# Patient Record
Sex: MALE | Race: WHITE | NOT HISPANIC OR LATINO | Employment: OTHER | URBAN - METROPOLITAN AREA
[De-identification: names, ages, dates, MRNs, and addresses within clinical notes are randomized per-mention and may not be internally consistent; named-entity substitution may affect disease eponyms.]

---

## 2020-05-07 ENCOUNTER — TRANSFERRED RECORDS (OUTPATIENT)
Dept: HEALTH INFORMATION MANAGEMENT | Facility: CLINIC | Age: 60
End: 2020-05-07

## 2020-05-07 LAB
ALT SERPL-CCNC: 41 U/L (ref 21–72)
AST SERPL-CCNC: 54 U/L (ref 17–59)
CREATININE (EXTERNAL): 1 MG/DL (ref 0.7–1.3)
GFR ESTIMATED (EXTERNAL): NORMAL ML/MIN/1.73M2
GFR ESTIMATED (IF AFRICAN AMERICAN) (EXTERNAL): NORMAL ML/MIN/1.73M2
GLUCOSE (EXTERNAL): 84 MG/DL (ref 74–106)
POTASSIUM (EXTERNAL): 3.5 MMOL/L (ref 3.4–5.1)

## 2020-05-14 ENCOUNTER — TRANSFERRED RECORDS (OUTPATIENT)
Dept: HEALTH INFORMATION MANAGEMENT | Facility: CLINIC | Age: 60
End: 2020-05-14

## 2023-03-31 ENCOUNTER — TRANSFERRED RECORDS (OUTPATIENT)
Dept: HEALTH INFORMATION MANAGEMENT | Facility: CLINIC | Age: 63
End: 2023-03-31

## 2023-04-24 ENCOUNTER — MEDICAL CORRESPONDENCE (OUTPATIENT)
Dept: HEALTH INFORMATION MANAGEMENT | Facility: CLINIC | Age: 63
End: 2023-04-24

## 2023-04-24 ENCOUNTER — TRANSFERRED RECORDS (OUTPATIENT)
Dept: HEALTH INFORMATION MANAGEMENT | Facility: CLINIC | Age: 63
End: 2023-04-24

## 2023-04-27 ENCOUNTER — TELEPHONE (OUTPATIENT)
Dept: SURGERY | Facility: CLINIC | Age: 63
End: 2023-04-27

## 2023-04-27 DIAGNOSIS — C18.7 MALIGNANT NEOPLASM OF SIGMOID COLON (H): Primary | ICD-10-CM

## 2023-04-27 NOTE — TELEPHONE ENCOUNTER
[facility]    Phone Message    May a detailed message be left on voicemail: yes     Reason for Call: Other:     Niesha from [facility] called to schedule a pt for adenocarcinoma of the colon. Caller stated they will be sending in a referral and records andthat that the pt needs to be seen ASAP. Caller also stated they will be pushing the CT scan that was done. Please review and reach out to pt to discuss an appt.       Colon Cancer adenocarcinoma  Jeferson Balderas @ [place], [place]  CT Scan (being pushed)      Caller stated pt can be reached at the below numbers and that the pt is hard of hearing and would prefer the call go to his spouse.    Pt cell- [phone]    Wife Hope- [phone]          Action Taken: Message routed to:  [facility] KRISTEN CARRERA     Travel Screening: Not Applicable

## 2023-04-27 NOTE — TELEPHONE ENCOUNTER
Colonoscopy done 4/24/2023 and found nearly obstructing sigmoid mass. Pathology confirmed adenocarcinoma.     CT CAP: 5/1/2023  CEA: 5/1/2023  Visit with surgeon: 5/2/2023

## 2023-04-27 NOTE — TELEPHONE ENCOUNTER
Diagnosis, Referred by & from: Nearly Obstructing Colon Cancer, Flex Sig   Appt date: 2023   NOTES STATUS DETAILS   OFFICE NOTE from referring provider N/A    OFFICE NOTE from other specialist Received [facility]:  3/31/23 - GEN SURG OV with Dr. Jesus  20, 20 - PCC OV with Dr. Rick   DISCHARGE SUMMARY from hospital N/A    DISCHARGE REPORT from the ER N/A    OPERATIVE REPORT N/A    MEDICATION LIST Received    LABS     ANAL PAP/CEA [facility]:  ([facility]) 23 - CEA   BIOPSIES/PATHOLOGY RELATED TO DIAGNOSIS Received [facility]:  23 - Colon Cancer (Case: [id number])   DIAGNOSTIC PROCEDURES     COLONOSCOPY Received [facility]:  23 - Colonoscopy   IMAGING (DISC & REPORT)      CT Received / [facility]:  ([facility]) 23 - CT Chest/Abd/Pelvis    [facility]:  20 - CT Abd/Pelvis     Records Requested  23    Facility  [facility]  Fax: [phone]   Outcome * 23 3:46 PM Faxed urg req to [facility] for pathology slides to be Fed'Exd to the clinic. - Lauren    * 5/3/23 11:06 AM Pathology received from [facility] and sent to be reviewed with filled out pathology form. - Lauren    Trackin

## 2023-05-01 ENCOUNTER — ANCILLARY PROCEDURE (OUTPATIENT)
Dept: CT IMAGING | Facility: CLINIC | Age: 63
End: 2023-05-01
Attending: SURGERY
Payer: MEDICAID

## 2023-05-01 ENCOUNTER — LAB (OUTPATIENT)
Dept: LAB | Facility: CLINIC | Age: 63
End: 2023-05-01
Payer: MEDICAID

## 2023-05-01 DIAGNOSIS — C18.7 MALIGNANT NEOPLASM OF SIGMOID COLON (H): ICD-10-CM

## 2023-05-01 LAB — CEA SERPL-MCNC: 72.2 NG/ML

## 2023-05-01 PROCEDURE — 74177 CT ABD & PELVIS W/CONTRAST: CPT | Performed by: RADIOLOGY

## 2023-05-01 PROCEDURE — 36415 COLL VENOUS BLD VENIPUNCTURE: CPT | Performed by: PATHOLOGY

## 2023-05-01 PROCEDURE — 82378 CARCINOEMBRYONIC ANTIGEN: CPT | Performed by: SURGERY

## 2023-05-01 PROCEDURE — 71260 CT THORAX DX C+: CPT | Performed by: RADIOLOGY

## 2023-05-01 RX ORDER — IOPAMIDOL 755 MG/ML
117 INJECTION, SOLUTION INTRAVASCULAR ONCE
Status: COMPLETED | OUTPATIENT
Start: 2023-05-01 | End: 2023-05-01

## 2023-05-01 RX ADMIN — IOPAMIDOL 117 ML: 755 INJECTION, SOLUTION INTRAVASCULAR at 07:07

## 2023-05-02 ENCOUNTER — PRE VISIT (OUTPATIENT)
Dept: SURGERY | Facility: CLINIC | Age: 63
End: 2023-05-02

## 2023-05-02 ENCOUNTER — PATIENT OUTREACH (OUTPATIENT)
Dept: ONCOLOGY | Facility: CLINIC | Age: 63
End: 2023-05-02

## 2023-05-02 ENCOUNTER — OFFICE VISIT (OUTPATIENT)
Dept: SURGERY | Facility: CLINIC | Age: 63
End: 2023-05-02
Payer: MEDICAID

## 2023-05-02 VITALS — DIASTOLIC BLOOD PRESSURE: 101 MMHG | HEART RATE: 80 BPM | SYSTOLIC BLOOD PRESSURE: 182 MMHG | OXYGEN SATURATION: 98 %

## 2023-05-02 DIAGNOSIS — C18.7 CANCER OF SIGMOID COLON METASTATIC TO INTRA-ABDOMINAL LYMPH NODE (H): ICD-10-CM

## 2023-05-02 DIAGNOSIS — C18.7 MALIGNANT NEOPLASM OF SIGMOID COLON (H): Primary | ICD-10-CM

## 2023-05-02 DIAGNOSIS — C77.2 CANCER OF SIGMOID COLON METASTATIC TO INTRA-ABDOMINAL LYMPH NODE (H): ICD-10-CM

## 2023-05-02 PROCEDURE — 99205 OFFICE O/P NEW HI 60 MIN: CPT | Mod: 25 | Performed by: SURGERY

## 2023-05-02 PROCEDURE — 45330 DIAGNOSTIC SIGMOIDOSCOPY: CPT | Mod: 53 | Performed by: SURGERY

## 2023-05-02 ASSESSMENT — PAIN SCALES - GENERAL: PAINLEVEL: SEVERE PAIN (7)

## 2023-05-02 NOTE — NURSING NOTE
Chief Complaint   Patient presents with     Cancer       Vitals:    05/02/23 0934   BP: (!) 182/101   BP Location: Left arm   Patient Position: Sitting   Cuff Size: Adult Regular   Pulse: 80   SpO2: 98%       There is no height or weight on file to calculate BMI.    Bright Courtney EMT-P

## 2023-05-02 NOTE — PATIENT INSTRUCTIONS
Medical Oncology will call you to schedule an appointment   Please call me if you decide to move forward with the ostomy     Sara -[phone]

## 2023-05-02 NOTE — LETTER
2023       RE: Michael Wong  [address] [address]     Dear Colleague,    Thank you for referring your patient, Michael Wong, to the [facility] [place] [facility]. Please see a copy of my visit note below.    Colon and Rectal Surgery Clinic Note    RE: Michael Wong.  : 1960.  LUIS: 2023.    Reason for visit: sigmoid cancer    HPI: Michael Wong is a 62 year old male who presents today for moderately differentiated sigmoid adenocarcinoma, MMR intact. On 2023 he underwent a colonoscopy for several years of rectal bleeding and fatigue which found a nearly-obstructing colonic mass in the distal sigmoid. Scope was not able to traverse. This unfortunately was positive for adenocarcinoma. CT Chest/Abdomen/Pelvis showed a bulky circumferential mass distal sigmoid colon 9 cm in length with regional lymphadenopathy. Nonregional conglomerate lymphadenopathy spanning the distal left para-aortic/proximal left common iliac retroperitoneum. Mild edema and nodularity in small bowel mesenteric fat right lower quadrant anteriorly, a small amount of peritoneal fluid in the bilateral fat-containing inguinal hernias and minimal soft tissue density stranding of the anterior perivesical fat have developed. Findings are not specific for but raise the possibility of early peritoneal disease. No focal liver lesions though presence of moderate diffuse hepatic steatosis can decrease sensitivity of small lesion detection. Adjacent 8 mm and 2 mm left upper lobe nodules are probably faintly calcified benign granulomas. CEA 72.2.    Today Michael feels fine. He reports scrotal swelling. He has 2-3 BMs per day, semi-formed. No nausea or vomiting with meals. Retired. Able to walk a few blocks without issues. ECOG1.    Colonoscopy (2023):      Surgical Pathology (2023):  \        CT Chest/Abdomen/Pelvis  (5/1/2023):  IMPRESSION:  1.  Bulky circumferential mass distal sigmoid colon 9 cm in length with regional lymphadenopathy.  2.  Nonregional conglomerate lymphadenopathy spanning the distal left para-aortic/proximal left common iliac retroperitoneum.  3.  Mild edema and nodularity in small bowel mesenteric fat right lower quadrant anteriorly, a small amount of peritoneal fluid in the bilateral fat-containing inguinal hernias and minimal soft tissue density stranding of the anterior perivesical fat   have developed. Findings are not specific for but raise the possibility of early peritoneal disease.  4.  No focal liver lesions though presence of moderate diffuse hepatic steatosis can decrease sensitivity of small lesion detection.  5.  Adjacent 8 mm and 2 mm left upper lobe nodules are probably faintly calcified benign granulomas.     CEA (5/1/2023):  72.2    Medical history:  No past medical history on file.    Surgical history:  No past surgical history on file.    Family history:  No family history on file.    Medications:  No current outpatient medications on file.     Allergies:  No Known Allergies    Social history:   Social History     Tobacco Use    Smoking status: Not on file    Smokeless tobacco: Not on file   Substance Use Topics    Alcohol use: Not on file     Marital status: .    ROS:  A complete review of systems was performed with the patient and all systems negative except as per HPI.    Physical Examination:  Exam was chaperoned by Bright Courtney, EMT-P  BP (!) 182/101 (BP Location: Left arm, Patient Position: Sitting, Cuff Size: Adult Regular)   Pulse 80   SpO2 98%   General: Well hydrated. No acute distress.  CV: RRR  Lung: Non-labored breathing on RA  Abdomen: Soft, NT, scrotal edema  Perianal external examination:  Perianal skin: Intact with no excoriation or lichenification.  Lesions: No evidence of an external lesion, nodularity, or induration in the perianal region.  Eversion of  "buttocks: There was not evidence of an anal fissure. Details: N/A.  Skin tags or external hemorrhoids: None.  Digital rectal examination: Was performed.   Sphincter tone: Good.  Palpable lesions: No.  Other: None.    Procedures:  Flexible sigmoidoscopy: after obtaining informed consent and performing a \"time out\", an adult flexible sigmoidoscope was introduced through the anus and passed up to the mid sigmoid colon. The quality of the prep was good. Findings: Large, exophytic mass at 18 cm from the AV, pseudo-obstructing. I could not transverse it with the scope. There was no active ulceration, inflammation or bleeding. No additional abnormalities were seen. Total scope time: 10 minutes. The patient tolerated the procedure well.      ASSESSMENT    This is a 62 year old M with a nearly obstructing sigmoid adenocarcinoma of the distal sigmoid colon, mod diff, pMMR, located at 18 cm from the AV with significant RP lymphadenopathy - stage IV disease. We had a long conversation with the pt explaining the management of metastatic colon cancer. Unfortunately this is not operable at this time. I would recommend discussion with medical oncology to start systemic treatment. Because of the pseudo-obstructing nature of his tumor, a diverting colostomy would be indicated. However, he is not obstructed at this time, so this can wait. Particularly, he needs time to process the news. He will talk to med onc and will contact our clinic if/wehn ready to move froward with the procedure. All questions were answered. Risks, benefits, and alternatives of treatment were thoroughly discussed with the patient, he understands these well and agrees to proceed.    All pertinent labs and imaging were personally reviewed by me.    PLAN  - Med onc referral  - Will contact our clinic to discuss timing of stoma    60 minutes spent on the date of the encounter doing chart review, history and exam, imaging review, documentation and further activities " as noted above, excluding the procedure time.      Referring Provider:  No referring provider defined for this encounter.     Primary Care Provider:  No primary care provider on file.        Again, thank you for allowing me to participate in the care of your patient.      Sincerely,    Jose Sheets MD

## 2023-05-03 NOTE — PROGRESS NOTES
"New Patient Oncology Nurse Navigator Note     Referring provider: Dr Ronquillo, Colorectal Surgery    Referring Clinic/Organization: [facility]     Referred to: Medical Oncology    Requested provider (if applicable): First available - did not specify     Referral Received: 05/02/23       Evaluation for : metastatic colon cancer     Clinical History (per Nurse review of records provided):      4/27/23 colonoscopy at [facility] ([place], [place])   Nearly obstructing distal sigmoid mass, biopsy confirms malignancy          5/1/2023 CT CAP at [facility]  IMPRESSION:  1.  Bulky circumferential mass distal sigmoid colon 9 cm in length with regional lymphadenopathy.  2.  Nonregional conglomerate lymphadenopathy spanning the distal left para-aortic/proximal left common iliac retroperitoneum.  3.  Mild edema and nodularity in small bowel mesenteric fat right lower quadrant anteriorly, a small amount of peritoneal fluid in the bilateral fat-containing inguinal hernias and minimal soft tissue density stranding of the anterior perivesical fat   have developed. Findings are not specific for but raise the possibility of early peritoneal disease.  4.  No focal liver lesions though presence of moderate diffuse hepatic steatosis can decrease sensitivity of small lesion detection.  5.  Adjacent 8 mm and 2 mm left upper lobe nodules are probably faintly calcified benign granulomas.       5/2/23 Pt met w/ CRS Dr Ronquillo who recommends consult with Medical Oncology for systemic treatment. Per MD \" Because of the pseudo-obstructing nature of his tumor, a diverting colostomy would be indicated. However, he is not obstructed at this time, so this can wait.\"    Clinical Assessment / Barriers to Care (Per Nurse):    Pt lives in [place] [facility] [place], and may prefer [facility] (~48min south of his address). Will offer video visit w/ Dr Gallardo at [place] 5/9 Tues to establish care (must be within [place] " borders for this video visit). Or schedule Dr Gallardo at [place] 5/17 in person to establish care with goal for chemo in [place]. Or schedule with any Onc per pt preference.      Records Location: [facility]   Faxed - Media tab/Scanned     Records Needed:     Needs official path consult on colo biopsy 4/27/23 at [facility] (if not already done by [facility] CRS team).       Additional testing needed prior to consult:     None          Cresencio Lopez, RN, BSN, OCN  Oncology New Patient Nurse Navigator   [facility] [place]  [phone]

## 2023-05-04 ENCOUNTER — LAB REQUISITION (OUTPATIENT)
Dept: LAB | Facility: CLINIC | Age: 63
End: 2023-05-04
Payer: MEDICAID

## 2023-05-04 LAB
PATH REPORT.COMMENTS IMP SPEC: ABNORMAL
PATH REPORT.COMMENTS IMP SPEC: ABNORMAL
PATH REPORT.COMMENTS IMP SPEC: YES
PATH REPORT.FINAL DX SPEC: ABNORMAL
PATH REPORT.GROSS SPEC: ABNORMAL
PATH REPORT.RELEVANT HX SPEC: ABNORMAL
PATH REPORT.RELEVANT HX SPEC: ABNORMAL
PATH REPORT.SITE OF ORIGIN SPEC: ABNORMAL

## 2023-05-04 PROCEDURE — 88321 CONSLTJ&REPRT SLD PREP ELSWR: CPT | Performed by: PATHOLOGY

## 2023-05-04 NOTE — TELEPHONE ENCOUNTER
RECORDS STATUS - ALL OTHER DIAGNOSIS      RECORDS RECEIVED FROM: Epic   DATE RECEIVED:    NOTES STATUS DETAILS   OFFICE NOTE from referring provider Epic 05/02/23: Dr. Jose Sheets   OPERATIVE REPORT External: [facility]  04/24/23: Colonoscopy   MEDICATION LIST [facility]    LABS     PATHOLOGY REPORTS Report in [facility] Path Consult:  05/04/23: [id number]   ANYTHING RELATED TO DIAGNOSIS Epic Most recent 05/01/23   IMAGING (NEED IMAGES & REPORT)     CT SCANS PACS 05/01/23: CT CAP  05/14/20: CT Abd Pel

## 2023-05-09 ENCOUNTER — PRE VISIT (OUTPATIENT)
Dept: ONCOLOGY | Facility: CLINIC | Age: 63
End: 2023-05-09
Payer: MEDICAID

## 2023-05-16 ENCOUNTER — TELEPHONE (OUTPATIENT)
Dept: ONCOLOGY | Facility: CLINIC | Age: 63
End: 2023-05-16
Payer: MEDICAID

## 2023-05-16 NOTE — TELEPHONE ENCOUNTER
MD called from [facility] checking on status of patient.   Patient has been seen by colo rectal @ [facility]  Patient needs to follow-up with oncology. Cancelled prev. Appointment will need to make a new one.   Message sent to oncology to call patient and assist in scheduling.     Jacinto GALAN RN  [facility]

## 2023-05-23 ENCOUNTER — DOCUMENTATION ONLY (OUTPATIENT)
Dept: ONCOLOGY | Facility: CLINIC | Age: 63
End: 2023-05-23
Payer: MEDICAID

## 2023-05-23 NOTE — PROGRESS NOTES
I was supposed to see Michael today in oncology clinic to discuss further management of metastatic colon cancer.  Unfortunately he is still in [place] so I could not do a billable visit.  But I did call him over the phone and discussed the situation.  In brief it looks like he has a newly diagnosed metastatic colon cancer arising from the distal sigmoid colon.  He has a bulky circumferential mass in the area measuring 9 cm in length with evidence of regional lymph node involvement.  He also has nonregional conglomerate of lymphadenopathy spanning the distal left para-aortic and proximal left iliac retroperitoneum which makes him technically stage IV.  Also has possible peritoneal disease.  No evidence of significant liver involvement or other organ involvement.  Explained to him that this is an incurable disease and systemic chemotherapy in the form of FOLFOX with or without VEGF or EGFR inhibitors is the mainstay of the treatment.  The idea is to shrink the tumor and potentially avoid complications in the immediate future.  He is at the highest risk for colonic obstruction and perforation.  A diverting colostomy can also be entertained either from the get go or if he develops any symptoms of obstruction.  If he were to have significant response to treatment then potentially a definitive surgical option could be explored but this is less likely.      I reviewed all these things with him.  He has not made any decisions in regards to whether he wants chemotherapy or not.  We also discussed about life expectancy with and without chemo.  I could only give him estimates based on clinical trial and survival data from [facility] and other databases.  But without treatment definitely he will have progressive disease both locally and elsewhere and could end up with bowel obstruction and perforation in the near future.  And probably life expectancy is measured in months.  He wants to think about it further.  I asked him about his  apprehension regarding pursuing systemic treatment.  He did not give me an answer.  He said he wants to think about it.  He lives in other [place].  If he cannot come to [place], then I asked him to explore local options.  He will call us with his decision.

## 2023-06-01 ENCOUNTER — HEALTH MAINTENANCE LETTER (OUTPATIENT)
Age: 63
End: 2023-06-01

## 2023-08-02 ENCOUNTER — PATIENT OUTREACH (OUTPATIENT)
Dept: ONCOLOGY | Facility: CLINIC | Age: 63
End: 2023-08-02
Payer: MEDICAID

## 2023-08-02 NOTE — PROGRESS NOTES
Pt saw Dr. Gallardo and was unsure about doing treatment. He was also going to explore local options.     Called pt to make sure he did not need anything from us.      for a return call with direct number.     Niurka Null RN on 8/2/2023 at 1:19 PM

## 2024-06-16 ENCOUNTER — HEALTH MAINTENANCE LETTER (OUTPATIENT)
Age: 64
End: 2024-06-16

## 2025-06-21 ENCOUNTER — HEALTH MAINTENANCE LETTER (OUTPATIENT)
Age: 65
End: 2025-06-21